# Patient Record
Sex: MALE | URBAN - METROPOLITAN AREA
[De-identification: names, ages, dates, MRNs, and addresses within clinical notes are randomized per-mention and may not be internally consistent; named-entity substitution may affect disease eponyms.]

---

## 2017-01-03 ENCOUNTER — APPOINTMENT (RX ONLY)
Dept: URBAN - METROPOLITAN AREA CLINIC 139 | Facility: CLINIC | Age: 82
Setting detail: DERMATOLOGY
End: 2017-01-03

## 2017-01-03 DIAGNOSIS — B37.2 CANDIDIASIS OF SKIN AND NAIL: ICD-10-CM

## 2017-01-03 PROBLEM — L57.0 ACTINIC KERATOSIS: Status: ACTIVE | Noted: 2017-01-03

## 2017-01-03 PROBLEM — L55.1 SUNBURN OF SECOND DEGREE: Status: ACTIVE | Noted: 2017-01-03

## 2017-01-03 PROCEDURE — ? KOH PREP

## 2017-01-03 PROCEDURE — ? TREATMENT REGIMEN

## 2017-01-03 PROCEDURE — ? COUNSELING

## 2017-01-03 PROCEDURE — ? ORDER TESTS

## 2017-01-03 PROCEDURE — 99202 OFFICE O/P NEW SF 15 MIN: CPT

## 2017-01-03 PROCEDURE — ? PRESCRIPTION

## 2017-01-03 RX ORDER — CLOTRIMAZOLE 10 MG/G
CREAM TOPICAL BID
Qty: 1 | Refills: 5 | Status: CANCELLED
Stop reason: ENTERED-IN-ERROR

## 2017-01-03 ASSESSMENT — LOCATION ZONE DERM
LOCATION ZONE: TRUNK
LOCATION ZONE: LEG
LOCATION ZONE: FEET

## 2017-01-03 ASSESSMENT — LOCATION DETAILED DESCRIPTION DERM
LOCATION DETAILED: LEFT ANTERIOR PROXIMAL THIGH
LOCATION DETAILED: LEFT DORSAL FOOT
LOCATION DETAILED: LEFT INGUINAL CREASE

## 2017-01-03 ASSESSMENT — LOCATION SIMPLE DESCRIPTION DERM
LOCATION SIMPLE: GROIN
LOCATION SIMPLE: LEFT FOOT
LOCATION SIMPLE: LEFT THIGH

## 2017-01-03 NOTE — PROCEDURE: KOH PREP
Showing: no pathologic findings
Detail Level: Detailed
Add  To Bill: Yes
Koh Intro Text (From The.....): A KOH prep was ordered and evaluated from the

## 2017-01-03 NOTE — PROCEDURE: TREATMENT REGIMEN
Continue Regimen: TAC only if needed intermittently
Plan: The patient is to call in 2 days with a status update.
Samples Given: Panoxyl 4% wash to be used daily until improved. Apply wash, lather up, let sit for 2-3 minutes then rinse, bolt dry and use a blow dryer to completely dry in creases. The patient will then apply the Clotrimazole BID until improved.
Initiate Treatment: Clotrimazole cream BID
Detail Level: Zone